# Patient Record
Sex: MALE | Race: OTHER | HISPANIC OR LATINO | URBAN - METROPOLITAN AREA
[De-identification: names, ages, dates, MRNs, and addresses within clinical notes are randomized per-mention and may not be internally consistent; named-entity substitution may affect disease eponyms.]

---

## 2019-06-19 ENCOUNTER — EMERGENCY (EMERGENCY)
Facility: HOSPITAL | Age: 74
LOS: 1 days | Discharge: LEFT BEFORE DISCHARGE | End: 2019-06-19
Attending: EMERGENCY MEDICINE | Admitting: EMERGENCY MEDICINE
Payer: SELF-PAY

## 2019-06-19 VITALS
HEART RATE: 106 BPM | RESPIRATION RATE: 18 BRPM | OXYGEN SATURATION: 98 % | HEIGHT: 72 IN | DIASTOLIC BLOOD PRESSURE: 82 MMHG | TEMPERATURE: 98 F | WEIGHT: 179.9 LBS | SYSTOLIC BLOOD PRESSURE: 144 MMHG

## 2019-06-19 DIAGNOSIS — R07.9 CHEST PAIN, UNSPECIFIED: ICD-10-CM

## 2019-06-19 PROCEDURE — 85025 COMPLETE CBC W/AUTO DIFF WBC: CPT

## 2019-06-19 PROCEDURE — 83880 ASSAY OF NATRIURETIC PEPTIDE: CPT

## 2019-06-19 PROCEDURE — 84484 ASSAY OF TROPONIN QUANT: CPT

## 2019-06-19 PROCEDURE — 99053 MED SERV 10PM-8AM 24 HR FAC: CPT

## 2019-06-19 PROCEDURE — 82553 CREATINE MB FRACTION: CPT

## 2019-06-19 PROCEDURE — 82550 ASSAY OF CK (CPK): CPT

## 2019-06-19 PROCEDURE — 36415 COLL VENOUS BLD VENIPUNCTURE: CPT

## 2019-06-19 PROCEDURE — 83735 ASSAY OF MAGNESIUM: CPT

## 2019-06-19 PROCEDURE — 85610 PROTHROMBIN TIME: CPT

## 2019-06-19 PROCEDURE — 80053 COMPREHEN METABOLIC PANEL: CPT

## 2019-06-19 PROCEDURE — 85730 THROMBOPLASTIN TIME PARTIAL: CPT

## 2019-06-19 PROCEDURE — 99284 EMERGENCY DEPT VISIT MOD MDM: CPT | Mod: 25

## 2019-06-19 PROCEDURE — 83690 ASSAY OF LIPASE: CPT

## 2019-06-19 PROCEDURE — 99283 EMERGENCY DEPT VISIT LOW MDM: CPT

## 2019-06-19 PROCEDURE — 93005 ELECTROCARDIOGRAM TRACING: CPT

## 2019-06-19 PROCEDURE — 93010 ELECTROCARDIOGRAM REPORT: CPT

## 2019-06-19 RX ORDER — SODIUM CHLORIDE 9 MG/ML
1000 INJECTION INTRAMUSCULAR; INTRAVENOUS; SUBCUTANEOUS ONCE
Refills: 0 | Status: DISCONTINUED | OUTPATIENT
Start: 2019-06-19 | End: 2019-06-23

## 2019-06-19 NOTE — ED PROVIDER NOTE - ATTENDING CONTRIBUTION TO CARE
74M c/o chest pain tonight, states intermittent.  no SOB.  no vomiting, no fevers.    gen- nad  heent- ncat, clear conj  cv -rrr  lungs -ctab  abd - soft, nt, nd  ext -wwp, no edema  neuro -aox3, steady gait, harkins  pt well-appearing, does not wish to stay in ED any longer for further treatment or testing.  pt with abnormal EKG however unwilling to stay in the ED.  pt left AMA. demonstrated clear understanding of risk of leaving AMA including but not limited to ACS, morbidity and death.

## 2019-06-19 NOTE — ED PROVIDER NOTE - CLINICAL SUMMARY MEDICAL DECISION MAKING FREE TEXT BOX
labs, cxr, ekg reviewed and results relayed with shared decision making process towards care moving forward labs taken ,, ekg reviewed  though patient walking about ED shouting for d/c and seeking AMA  d/c refusing multiple attempts to render care and or discuss circumstances and such given labs taken and results relayed  ,, ekg  as well reviewed  though patient walking about ED shouting for d/c and seeking AMA  d/c refusing multiple attempts to render care and or discuss circumstances and such ama d/c given

## 2024-01-24 ENCOUNTER — EMERGENCY (EMERGENCY)
Facility: HOSPITAL | Age: 79
LOS: 1 days | Discharge: HOME | End: 2024-01-24
Admitting: EMERGENCY MEDICINE
Payer: MEDICARE

## 2024-01-24 VITALS
HEART RATE: 81 BPM | OXYGEN SATURATION: 98 % | RESPIRATION RATE: 18 BRPM | SYSTOLIC BLOOD PRESSURE: 106 MMHG | TEMPERATURE: 98 F | DIASTOLIC BLOOD PRESSURE: 57 MMHG

## 2024-01-24 DIAGNOSIS — Z04.3 ENCOUNTER FOR EXAMINATION AND OBSERVATION FOLLOWING OTHER ACCIDENT: ICD-10-CM
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
## 2024-01-24 PROCEDURE — 70450 CT HEAD/BRAIN W/O DYE: CPT | Mod: 26

## 2024-01-24 PROCEDURE — 99284 EMERGENCY DEPT VISIT MOD MDM: CPT

## 2024-01-24 PROCEDURE — 72125 CT NECK SPINE W/O DYE: CPT | Mod: 26

## 2024-01-24 NOTE — ED PROVIDER NOTE - PATIENT PORTAL LINK FT
You can access the FollowMyHealth Patient Portal offered by [facility] by registering at the following website: [email or web address]. By joining [facility]’s FollowMyHealth portal, you will also be able to view your health information using other applications (apps) compatible with our system.

## 2024-01-24 NOTE — ED ADULT TRIAGE NOTE - CHIEF COMPLAINT QUOTE
Pt BIBEMS complaining of fall. PT states that he thinks he missed a step. Pt states that he fell  back hitting his head. Denies loc and use of blood thinners. Pt AAOx 3. Pt lives in shelter on [facility] Director Ms. Antonino [phone].

## 2024-01-24 NOTE — ED PROVIDER NOTE - PHYSICAL EXAMINATION
VITAL SIGNS: I have reviewed nursing notes and confirm.  CONSTITUTIONAL: Well-developed; well-nourished; in no acute distress.  SKIN: No acute rash.  HEAD: Normocephalic; atraumatic.  CARD: No extremity cyanosis. RRR, S1S2.  RESP: Speaks in full, clear sentences. CTA pb. No adventitious BS.  EXT: Moves all extremities normally.  NEURO: Alert, oriented. Grossly unremarkable. No focal deficits. Fluent speech.   PSYCH: Cooperative, appropriate. Mood and affect wnl.

## 2024-01-24 NOTE — ED PROVIDER NOTE - CLINICAL SUMMARY MEDICAL DECISION MAKING FREE TEXT BOX
78-year-old male, denies significant history, presenting to the emergency room complaining of a fall that occurred today. No scalp tenderness on exam and patient has no focal deficits as well.  He is otherwise well-appearing and in no acute distress.  Will plan to obtain CT head and cervical spine to rule out any acute findings.  Likely discharge in setting of negative workup.

## 2024-01-24 NOTE — ED ADULT NURSE NOTE - OBJECTIVE STATEMENT
Pt reports missing a step and falling back on his head. No LOC, no n/v, no vision changes. Pt denies c-spine tenderness.

## 2024-01-24 NOTE — ED ADULT NURSE REASSESSMENT NOTE - NS ED NURSE REASSESS COMMENT FT1
Patient endorsed to me at this time - c/c fall. Patient A+Ox3, reported he missed a step and fell backwards hitting his head. Denies LOC, not on anticoagulation, denies any pain at this time. No acute distress noted. Pending CT results.

## 2024-01-24 NOTE — ED ADULT NURSE NOTE - CHIEF COMPLAINT QUOTE
Pt BIBEMS complaining of fall. PT states that he thinks he missed a step. Pt states that he fell  back hitting his head. Denies loc and use of blood thinners. Pt AAOx 3. Pt lives in shelter on [facility] Director Ms. Antonino [phone].

## 2024-01-24 NOTE — ED ADULT NURSE NOTE - NSFALLRISKINTERV_ED_ALL_ED

## 2024-01-24 NOTE — ED PROVIDER NOTE - OBJECTIVE STATEMENT
78-year-old male, denies significant history, presenting to the emergency room complaining of a fall that occurred today.  Patient believes he missed a step and fell backwards.  He endorses head strike but denies LOC.  Patient further reports no headaches and does not use any blood thinners.  He also denies any neck or back pain.  Patient was able to ambulate since the incident.

## 2024-01-25 VITALS
TEMPERATURE: 98 F | HEART RATE: 60 BPM | SYSTOLIC BLOOD PRESSURE: 103 MMHG | DIASTOLIC BLOOD PRESSURE: 62 MMHG | RESPIRATION RATE: 18 BRPM | OXYGEN SATURATION: 98 %

## 2024-01-25 RX ORDER — HYDROXYZINE HCL 10 MG
25 TABLET ORAL ONCE
Refills: 0 | Status: COMPLETED | OUTPATIENT
Start: 2024-01-25 | End: 2024-01-25

## 2024-01-25 RX ADMIN — Medication 25 MILLIGRAM(S): at 05:15

## 2024-05-30 ENCOUNTER — EMERGENCY (EMERGENCY)
Facility: HOSPITAL | Age: 79
LOS: 1 days | Discharge: HOME | End: 2024-05-30
Admitting: STUDENT IN AN ORGANIZED HEALTH CARE EDUCATION/TRAINING PROGRAM
Payer: MEDICARE

## 2024-05-30 VITALS
SYSTOLIC BLOOD PRESSURE: 129 MMHG | DIASTOLIC BLOOD PRESSURE: 82 MMHG | RESPIRATION RATE: 16 BRPM | HEART RATE: 83 BPM | OXYGEN SATURATION: 99 % | TEMPERATURE: 99 F

## 2024-05-30 PROCEDURE — 99284 EMERGENCY DEPT VISIT MOD MDM: CPT

## 2024-05-30 PROCEDURE — 99053 MED SERV 10PM-8AM 24 HR FAC: CPT

## 2024-05-30 PROCEDURE — 99283 EMERGENCY DEPT VISIT LOW MDM: CPT

## 2024-05-30 RX ORDER — DIPHENHYDRAMINE HCL 50 MG
50 CAPSULE ORAL ONCE
Refills: 0 | Status: COMPLETED | OUTPATIENT
Start: 2024-05-30 | End: 2024-05-30

## 2024-05-30 RX ORDER — DIPHENHYDRAMINE HCL 50 MG
1 CAPSULE ORAL
Qty: 15 | Refills: 0
Start: 2024-05-30 | End: 2024-06-03

## 2024-05-30 RX ADMIN — Medication 60 MILLIGRAM(S): at 23:41

## 2024-05-30 RX ADMIN — Medication 50 MILLIGRAM(S): at 23:42

## 2024-05-30 NOTE — ED PROVIDER NOTE - OBJECTIVE STATEMENT
80 yo male w/o any known pmh, not on any medications, in the Er c/o generalized itching. Pt reports its started today and getting worse. pt also mentioned he lives in the shelter for the past year. Had similar itching in the past. Unknown causes. Pt states he was seen at the different ER and given " pills for a few days" and itching stopped.  pt denies any fever, chills, difficulty swallowing, difficulty breathing, denies any pain

## 2024-05-30 NOTE — ED PROVIDER NOTE - NSFOLLOWUPINSTRUCTIONS_ED_ALL_ED_FT
Please take medications as prescribed and follow up in a few days at the clinic with PMD or dermatologist for re-evaluation.         Rash, Adult  Heat rash on a person's hand.   A rash is a breakout of spots or blotches on the skin. It can affect the way the skin looks and feels. Many things can cause a rash. Common causes include:  Viral infections. These include colds, measles, and hand, foot, and mouth disease.  Bacterial infections. These include scarlet fever and impetigo.  Fungal infections. These include athlete's foot, ringworm, and yeast rashes.  Skin irritation. This may be from heat rash, exposure to moisture or friction for a long time (intertrigo), or exposure to soap or skin care products (eczema).  Allergic reactions. These may be caused by foods, medicines, or things like poison ivy.  Some rashes may go away after a few days. Others may last for a few weeks. The goal of treatment is to stop the itching and keep the rash from spreading.    Follow these instructions at home:  Medicine    A tube of ointment.  Take or apply over-the-counter and prescription medicines only as told by your health care provider. These may include:  Corticosteroids. These can help treat red or swollen skin. They may be given as creams or as medicines to take by mouth (orally).  Anti-itch lotions.  Allergy medicines.  Pain medicine.  Antifungal medicine if the rash is from a fungal infection.  Antibiotics if you have an infection.  Skin care    Apply cool, wet cloths (compresses) to the affected areas.  Do not scratch or rub your skin.  Avoid covering the rash. Keep it exposed to air as often as you can.  Managing itching and discomfort    Avoid hot showers and baths. These can make itching worse. A cold shower may help.  Try taking a bath with:  Epsom salts. You can get these at your local pharmacy or grocery store. Follow the instructions on the package.  Baking soda. Pour a small amount into the bath as told by your provider.  Colloidal oatmeal. You can get this at your local pharmacy or grocery store. Follow the instructions on the package.  Try putting baking soda paste on your skin. Stir water into baking soda until it becomes like a paste.  Try using calamine lotion or cortisone cream to help with itchiness.  Keep cool. Stay out of the sun. Sweating and being hot can make itching worse.  General instructions    A person writing in a journal.  Rest as needed.  Drink enough fluid to keep your pee (urine) pale yellow.  Wear loose-fitting clothes.  Avoid scented soaps, detergents, and perfumes. Use gentle soaps, detergents, perfumes, and cosmetics.  Avoid the things that cause your rash (triggers). Keep a journal to help keep track of your triggers. Write down:  What you eat.  What cosmetics you use.  What you drink.  What you wear. This includes jewelry.  Contact a health care provider if:  You sweat at night more than normal.  You pee (urinate) more or less than normal, or your pee is a darker color than normal.  Your eyes become sensitive to light.  Your skin or the white parts of your eyes turn yellow (jaundice).  Your skin tingles or is numb.  You get painful blisters in your nose or mouth.  Your rash does not go away after a few days, or it gets worse.  You are more tired or thirsty than normal.  You have new or worse symptoms. These may include:  Pain in your abdomen.  Fever.  Diarrhea or vomiting.  Weakness or weight loss.  Get help right away if:  You get confused.  You have a severe headache, a stiff neck, or severe joint pain or stiffness.  You become very sleepy or not responsive.  You have a seizure.  This information is not intended to replace advice given to you by your health care provider. Make sure you discuss any questions you have with your health care provider.

## 2024-05-30 NOTE — ED ADULT NURSE NOTE - ISAR SCREEN YN
Normal rate, regular rhythm.  Heart sounds S1, S2.  No murmurs, rubs or gallops. no chest wall ttp
No

## 2024-05-30 NOTE — ED PROVIDER NOTE - CLINICAL SUMMARY MEDICAL DECISION MAKING FREE TEXT BOX
78 yo male w/o any known pmh, not on any medications, in the Er c/o generalized itching. Pt reports its started today and getting worse. pt also mentioned he lives in the shelter for the past year. Had similar itching in the past. Unknown causes. Pt states he was seen at the different ER and given " pills for a few days" and itching stopped.  pt denies any fever, chills, difficulty swallowing, difficulty breathing, denies any pain.   Pt is afebrile and non -toxic appearing, no clinical findings to suspect rash be an infectious in origin, most likely an allergic response. plan for antihistamines and steroids oral. d/c home with Rx for then same. no airway compromise, no oropharyngeal involvement. d/c home stable. f/u with dermatologist recommended.

## 2024-05-30 NOTE — ED PROVIDER NOTE - PATIENT PORTAL LINK FT
You can access the FollowMyHealth Patient Portal offered by [facility] by registering at the following website: [email or web address]. By joining [facility]’s FollowMyHealth portal, you will also be able to view your health information using other applications (apps) compatible with our system.

## 2024-05-30 NOTE — ED ADULT NURSE NOTE - OBJECTIVE STATEMENT
pt received into spot E awake alert appears comfortable biba from the shelter for eval of body itching. no begs visible, respirations unlabored airway patent speaks clear full sentences no tongue or throat swelling hives or rash. pt requesting bed to lie down and refusing to stay in assigned treatment room wandering throughout department for empty bed. educated on importance of staying in assigned spot to expedite evaluation as provider will be able to find him there not wandering about pt received into spot E awake alert appears comfortable biba from the shelter for eval of body itching. no begs visible, respirations unlabored airway patent speaks clear full sentences no tongue or throat swelling but noted hives generalized on torso. pt requesting bed to lie down and refusing to stay in assigned treatment room wandering throughout department for empty bed. educated on importance of staying in assigned spot to expedite evaluation as provider will be able to find him there not wandering about

## 2024-05-30 NOTE — ED PROVIDER NOTE - NSPTACCESSSVCSAPPT_ED_ALL_ED
Specialty Care (immediate).../Specialty Care and/or PCP (routine).../Connect patient to Primary Care...

## 2024-05-30 NOTE — ED PROVIDER NOTE - SKIN, MLM
Skin normal color for race, warm, dry and intact.  generalized diffuse slightly raised, pruritic rash, no excoriations, no lesions, no ulcers,

## 2024-05-30 NOTE — ED PROVIDER NOTE - NSFOLLOWUPCLINICS_GEN_ALL_ED_FT
[facility]  Family Medicine  [address]  [place], [place] [address]  Phone: [phone]  Fax:

## 2024-05-30 NOTE — ED ADULT NURSE NOTE - NSFALLUNIVINTERV_ED_ALL_ED
Bed/Stretcher in lowest position, wheels locked, appropriate side rails in place/Call bell, personal items and telephone in reach/Instruct patient to call for assistance before getting out of bed/chair/stretcher/Non-slip footwear applied when patient is off stretcher/[place] to call system/Physically safe environment - no spills, clutter or unnecessary equipment/Purposeful proactive rounding/Room/bathroom lighting operational, light cord in reach

## 2024-05-30 NOTE — ED ADULT NURSE NOTE - AS PAIN REST
2 (mild pain) Home Suture Removal Text: Patient was provided instructions on removing sutures and will remove their sutures at home.  If they have any questions or difficulties they will call the office.

## 2024-05-31 VITALS
RESPIRATION RATE: 18 BRPM | SYSTOLIC BLOOD PRESSURE: 145 MMHG | TEMPERATURE: 98 F | HEART RATE: 73 BPM | OXYGEN SATURATION: 99 % | DIASTOLIC BLOOD PRESSURE: 78 MMHG

## 2024-05-31 DIAGNOSIS — L29.9 PRURITUS, UNSPECIFIED: ICD-10-CM

## 2024-05-31 DIAGNOSIS — L50.9 URTICARIA, UNSPECIFIED: ICD-10-CM

## 2024-12-31 ENCOUNTER — EMERGENCY (EMERGENCY)
Facility: HOSPITAL | Age: 79
LOS: 1 days | Discharge: HOME | End: 2024-12-31
Attending: EMERGENCY MEDICINE | Admitting: EMERGENCY MEDICINE
Payer: MEDICARE

## 2024-12-31 VITALS
SYSTOLIC BLOOD PRESSURE: 155 MMHG | DIASTOLIC BLOOD PRESSURE: 84 MMHG | HEART RATE: 100 BPM | RESPIRATION RATE: 18 BRPM | OXYGEN SATURATION: 96 % | TEMPERATURE: 98 F

## 2024-12-31 DIAGNOSIS — L29.9 PRURITUS, UNSPECIFIED: ICD-10-CM

## 2024-12-31 DIAGNOSIS — B85.2 PEDICULOSIS, UNSPECIFIED: ICD-10-CM

## 2024-12-31 PROCEDURE — 99284 EMERGENCY DEPT VISIT MOD MDM: CPT

## 2024-12-31 RX ORDER — DIPHENHYDRAMINE HCL 25 MG
50 CAPSULE ORAL ONCE
Refills: 0 | Status: COMPLETED | OUTPATIENT
Start: 2024-12-31 | End: 2024-12-31

## 2024-12-31 RX ADMIN — Medication 1 APPLICATION(S): at 11:49

## 2024-12-31 RX ADMIN — Medication 50 MILLIGRAM(S): at 11:48

## 2024-12-31 NOTE — ED PROVIDER NOTE - PHYSICAL EXAMINATION
GENERAL: well appearing in no acute distress, non-toxic appearing  HEAD: normocephalic, atraumatic  CARDIAC: regular rate and rhythm, normal S1S2, no appreciable murmurs, 2+ pulses in UE/LE b/l  PULM: normal breath sounds, clear to ascultation bilaterally, no rales, rhonchi, wheezing  GI: abdomen nondistended, soft, nontender, no guarding, rebound tenderness  MSK: b/l peripheral edema  SKIN raised erythematous papules w no vesicular lesions to chest arms and abdomen sparing palms/mucosal surfaces

## 2024-12-31 NOTE — ED PROVIDER NOTE - CLINICAL SUMMARY MEDICAL DECISION MAKING FREE TEXT BOX
no known pmhx presents from shelter for full body pruritis. states he has had it the past few weeks. denies any bug bites. denies any fevers, n/v, chest pain, sob, abdominal pain. on arrival bp 155/84  orally 98 degrees 96% on RA. pt appears uncomfortable due to itching, and has excoriations w raised papules on chest abdomen and arms. has been seen in the ED this past year twice for the same concern, states he got a pill which helped for a few days atnd it stopped. no vesicular lesions, nothing on palms or in oral mucosa. will give benadryl and permethrin concern for bed bugs and dc w primary care follow up. ekg showing 1st degree av block, will refer to cardiology, pt has no active chest pain at this time.

## 2024-12-31 NOTE — ED PROVIDER NOTE - CARE PLAN
1 Principal Discharge DX:	Skin pruritus   Principal Discharge DX:	Skin pruritus  Secondary Diagnosis:	Lice infestation

## 2024-12-31 NOTE — ED PROVIDER NOTE - PATIENT PORTAL LINK FT
You can access the FollowMyHealth Patient Portal offered by [facility] by registering at the following website: [email or web address]. By joining [employer]’s FollowMyHealth portal, you will also be able to view your health information using other applications (apps) compatible with our system.

## 2024-12-31 NOTE — ED PROVIDER NOTE - NSFOLLOWUPINSTRUCTIONS_ED_ALL_ED_FT
You were seen in the ED for a full body rash. Use the cream permethrin as instructed. Follow up with your Primary Doctor, and I have put in a referral for cardiologist.    Return with any fevers, chest pain, or any other concerning symptoms.

## 2024-12-31 NOTE — ED PROVIDER NOTE - NS ED ATTENDING STATEMENT MOD
I have seen and examined this patient and fully participated in the care of this patient as the teaching attending.  The service was shared with the VINI.  I reviewed and verified the documentation.

## 2024-12-31 NOTE — ED ADULT TRIAGE NOTE - CHIEF COMPLAINT QUOTE
"arabella been feeling weak for months and people keep stealing from me in the shelter"  befast negative

## 2024-12-31 NOTE — ED ADULT TRIAGE NOTE - SPO2 (%)
Remdesivir Initiation Note    This patient meets criteria for initiation of remdesivir based on the following:  · Proven COVID-19  · Moderate disease (Requiring supplemental O2)  · Acceptable hepatic function (ALT within 5 times ULN)    Exclusion Criteria:  ? Severe disease requiring invasive or non-invasive mechanical ventilation (includes HFNC & BiPAP)  ? Could consider use in patients requiring high flow if early on in the disease course (based on symptom duration)  ? Use of more than 1 vasopressor prior to remdesivir initiation  ? Already improving on supportive treatment and/or impending discharge  ? Patients in whom the clinical team think death is in the immediate short-term where remdesivir is unlikely to change the clinical outcome     Liver function tests will be monitored daily while on remdesivir. 96

## 2024-12-31 NOTE — ED PROVIDER NOTE - OBJECTIVE STATEMENT
79 no known pmhx presents from [facility] for full body pruritis. states he has had it the past few weeks. deneis any bug bites. denies ayn fevers, n/v, chest pain, sob, abdominal pain. on arrival bp 155/84  orally 98 degrees 96% on RA.

## 2025-04-27 ENCOUNTER — EMERGENCY (EMERGENCY)
Facility: HOSPITAL | Age: 80
LOS: 1 days | End: 2025-04-27
Attending: STUDENT IN AN ORGANIZED HEALTH CARE EDUCATION/TRAINING PROGRAM | Admitting: EMERGENCY MEDICINE
Payer: MEDICARE

## 2025-04-27 VITALS
SYSTOLIC BLOOD PRESSURE: 133 MMHG | HEART RATE: 93 BPM | WEIGHT: 134.92 LBS | RESPIRATION RATE: 18 BRPM | TEMPERATURE: 98 F | DIASTOLIC BLOOD PRESSURE: 61 MMHG | OXYGEN SATURATION: 97 %

## 2025-04-27 PROCEDURE — 99284 EMERGENCY DEPT VISIT MOD MDM: CPT

## 2025-04-27 RX ORDER — IBUPROFEN 200 MG
600 TABLET ORAL ONCE
Refills: 0 | Status: COMPLETED | OUTPATIENT
Start: 2025-04-27 | End: 2025-04-27

## 2025-04-27 RX ORDER — BACITRACIN 500 UNIT/G
1 OINTMENT (GRAM) TOPICAL ONCE
Refills: 0 | Status: COMPLETED | OUTPATIENT
Start: 2025-04-27 | End: 2025-04-27

## 2025-04-27 RX ORDER — ACETAMINOPHEN 500 MG/5ML
650 LIQUID (ML) ORAL ONCE
Refills: 0 | Status: COMPLETED | OUTPATIENT
Start: 2025-04-27 | End: 2025-04-27

## 2025-04-27 RX ADMIN — Medication 600 MILLIGRAM(S): at 01:42

## 2025-04-27 RX ADMIN — Medication 1 APPLICATION(S): at 01:42

## 2025-04-27 RX ADMIN — Medication 650 MILLIGRAM(S): at 01:42

## 2025-04-27 NOTE — ED PROVIDER NOTE - NSFOLLOWUPCLINICS_GEN_ALL_ED_FT
[facility]  Podiatry  [address] [place] [address]  Phone: [phone]  Fax:

## 2025-04-27 NOTE — ED PROVIDER NOTE - NSFOLLOWUPINSTRUCTIONS_ED_ALL_ED_FT
[FreeTextEntry1] : Podiatric evaluation and treatment full thickness debridement of ulceration right hallux\par no need for additional oral antibiosis\par  x-rays 3 views right foot no signs of fracture or osteomyelitis noted on x-ray report\par stress importance of staying off of right foot\par recommend good blood sugar control\par refer to vascular service for evaluation of venous insufficiency bilaterally\par \par Patient using Mupirocin on toe\par recommend patient always wear low profile cam walker for right foot ulcer at all times when ambulating\par full thickness debridement of ulcer with sterile #10 blade to the level of the subcutaneous tissue\par No signs of infection both feet\par Rx Rocker bottom to right shoe- refer to mateo villanueva patient not wearing rocker bottom boot\par Discuss IPJ fusion right hallux if wound doesn't heal patient wants to hold off on surgery and will try to wear cam walker more often\par patient is not staying off of right foot so surgical management fusion is not recommended at this time\par patient is at very high risk for amputation of right hallux since he has not been staying off of right foot and continues to ambulate without off loading shoe\par return 2 week\par \par V/S: 150/70, HR: 77, Temp:98.1, Resp:18 Chronic Pain    Chronic pain is a complex condition and the Emergency Department is not the ideal place to manage this condition. Prescription painkillers must be written by your primary care provider or a pain management physician. Avoid activities or triggers that exacerbate your pain.    SEEK IMMEDIATE MEDICAL CARE IF YOU HAVE ANY OF THE FOLLOWING SYMPTOMS: severe chest pain, fainting, vomiting blood, dark or bloody stools, or pain different from your chronic pain.

## 2025-04-27 NOTE — ED PROVIDER NOTE - PATIENT PORTAL LINK FT
You can access the FollowMyHealth Patient Portal offered by [facility] by registering at the following website: [email or web address]. By joining [employer]’s FollowMyHealth portal, you will also be able to view your health information using other applications (apps) compatible with our system.

## 2025-04-27 NOTE — ED PROVIDER NOTE - CLINICAL SUMMARY MEDICAL DECISION MAKING FREE TEXT BOX
79 undomiciled w no sig pmh p/w b/l feet pain. states due to bad socks and wet weather, Patient denies headache, vision changes, eye pain, LOC, focal weakness/numbness, neck pain, fever, cough sore throat, chills, chest pain, palpitations, shortness of breath, wheezing, stridor, neck/back pain, other MSK pain, abd pain, nausea, vomiting, diarrhea, constipation, blood in stool, urinary cmplaints, rash, trauma. states he has not seen podiatrist. no signs of infection, no trauma, low suspciion for fx vs dislocation. will treat chronic pain  w tylenol ibuprofen and give new socks, pt requesting food

## 2025-04-27 NOTE — ED PROVIDER NOTE - PHYSICAL EXAMINATION
General: No acute distress, mentation at baseline,  well nourished, well developed  HEENT: NCAT, Neck supple without meningismus, PERRL, no conjunctival injection  Lungs: CTAB, No wheeze or crackles, No retractions, No increased work of breathing  Heart: S1S2 RRR, No M/R/G, Pules equal Bilaterally in upper and lower extremities distally  Abd: soft, NT/ND, No guarding, No rebound.  No hernias, no palpable masses.  bilateral feet: Proximal Tibia nontender, Medial malleolus nontender, Lateral malleolus nontender, Calcaneus nontender , Tarsometatarsal region nontender, Rest of foot and ankle without marked tenderness, Varus and Valgus Stress of ankle joint without significant laxity, Full Range of Motion with full strength, Skin on plantar section of midfoot without ecchymosism Capillary refill <2seconds, Distal Sensation to light touch intact, Compartments surrounding are soft . skin abrasions over b/l feet  Skin: No rash noted, warm dry.  Neuro:  Grossly normal.  No difficulty ambulating. No focal deficits.  Psychiatric: Appropriate mood and affect.

## 2025-04-29 DIAGNOSIS — M79.671 PAIN IN RIGHT FOOT: ICD-10-CM

## 2025-04-29 DIAGNOSIS — M79.604 PAIN IN RIGHT LEG: ICD-10-CM

## 2025-04-29 DIAGNOSIS — M79.605 PAIN IN LEFT LEG: ICD-10-CM

## 2025-04-29 DIAGNOSIS — M79.672 PAIN IN LEFT FOOT: ICD-10-CM

## 2025-05-05 ENCOUNTER — EMERGENCY (EMERGENCY)
Facility: HOSPITAL | Age: 80
LOS: 1 days | End: 2025-05-05
Attending: EMERGENCY MEDICINE | Admitting: EMERGENCY MEDICINE
Payer: MEDICARE

## 2025-05-05 VITALS
WEIGHT: 139.99 LBS | OXYGEN SATURATION: 98 % | SYSTOLIC BLOOD PRESSURE: 112 MMHG | HEIGHT: 72 IN | RESPIRATION RATE: 18 BRPM | TEMPERATURE: 99 F | HEART RATE: 74 BPM | DIASTOLIC BLOOD PRESSURE: 66 MMHG

## 2025-05-05 PROBLEM — Z78.9 OTHER SPECIFIED HEALTH STATUS: Chronic | Status: ACTIVE | Noted: 2025-04-27

## 2025-05-05 PROCEDURE — 99053 MED SERV 10PM-8AM 24 HR FAC: CPT

## 2025-05-05 PROCEDURE — 99283 EMERGENCY DEPT VISIT LOW MDM: CPT

## 2025-05-05 RX ORDER — ACETAMINOPHEN 500 MG/5ML
975 LIQUID (ML) ORAL ONCE
Refills: 0 | Status: COMPLETED | OUTPATIENT
Start: 2025-05-05 | End: 2025-05-05

## 2025-05-05 RX ORDER — NAPROXEN SODIUM 275 MG
500 TABLET ORAL ONCE
Refills: 0 | Status: COMPLETED | OUTPATIENT
Start: 2025-05-05 | End: 2025-05-05

## 2025-05-05 RX ADMIN — Medication 975 MILLIGRAM(S): at 06:56

## 2025-05-05 RX ADMIN — Medication 500 MILLIGRAM(S): at 06:57

## 2025-05-05 NOTE — ED PROVIDER NOTE - PATIENT PORTAL LINK FT
You can access the FollowMyHealth Patient Portal offered by [facility] by registering at the following website: [email or web address]. By joining [employer]’s FollowMyHealth portal, you will also be able to view your health information using other applications (apps) compatible with our system.

## 2025-05-05 NOTE — ED ADULT NURSE NOTE - CHIEF COMPLAINT QUOTE
BIBA [facility]. Pt c/o B/L leg pain that started appx 2 hours ago. Pt denies fall or injuries. Pt denies further at triage. NKDA/-PMH.

## 2025-05-05 NOTE — ED PROVIDER NOTE - OBJECTIVE STATEMENT
80 yo M with no known PMHx, undomiciled, BIBA for BLE pain x few hours. [place] the train and noted pain to BLE with stiffness sensation. Denies fever, chills, trauma, fall, CP, SOB, palpitations, N/V, HA, dizziness, focal weakness, rash, calf pain, swelling, redness, change in urinary/bowel function, LOC, and malaise.

## 2025-05-05 NOTE — ED PROVIDER NOTE - PHYSICAL EXAMINATION
Exam chaperoned by PCT Diogo Wang - Unkempt M, NAD, comfortable and non-toxic appearing, speaking in full sentences  Skin - warm, dry, intact, no acute rash   HEENT - AT/NC, no nasal discharge, poor dentition, airway patent, neck supple and FROM  CV - S1S2, R/R/R  Resp - CTAB, no r/r/w  GI - soft, NT, +palpable b/l inguinal hernia, reducible, no CVAT b/l   MS - No acute or gross deformities noted to extremities. w/w/p, no c/c/e, no focal tenderness, FROM, NV intact, +SILT, compartment soft, brisk cap refill, No midline spinal tenderness or step off on palpation  Neuro - AxOx3, no focal neuro deficits, ambulatory without gait disturbance

## 2025-05-05 NOTE — ED PROVIDER NOTE - ATTENDING CONTRIBUTION TO CARE
80 yo M with no known PMHx, undomiciled, BIBA for BLE pain x few hours. Patient received appropriate medical screening exam. agree with management and documentation.

## 2025-05-05 NOTE — ED PROVIDER NOTE - CARE PROVIDER_API CALL
Rhona Pattersonbeth  Internal Medicine  [address] [place] [id number]  Phone: [phone]  Fax: [phone]  Follow Up Time:

## 2025-05-05 NOTE — ED PROVIDER NOTE - NSICDXNOPASTSURGICALHX_GEN_ALL_ED
<-- Click to add NO significant Past Surgical History Taltz Counseling: I discussed with the patient the risks of ixekizumab including but not limited to immunosuppression, serious infections, worsening of inflammatory bowel disease and drug reactions.  The patient understands that monitoring is required including a PPD at baseline and must alert us or the primary physician if symptoms of infection or other concerning signs are noted.

## 2025-05-05 NOTE — ED PROVIDER NOTE - CLINICAL SUMMARY MEDICAL DECISION MAKING FREE TEXT BOX
78 yo M with no known PMHx, undomiciled, BIBA for BLE pain x few hours. [place] the train and noted pain to BLE with stiffness sensation.   - NV intact, compartment soft, palpable symmetric distal pulses, no focal neuro deficits, no acute wound or rash on exam. Not suspecting acute neuro/arterial/venous/infectious process   - offered oral analgesics with adequate controlled of pain   - medically stable for dc and outpt resources provided

## 2025-05-05 NOTE — ED ADULT TRIAGE NOTE - CHIEF COMPLAINT QUOTE
BIBA [facility]. Pt c/o B/L leg pain that started appx 2 hours ago. Pt denies fall or injuries. Pt denies further at triage. NKDA/-PMH.

## 2025-05-05 NOTE — ED ADULT NURSE NOTE - PRIMARY CARE PROVIDER
unk Xolair Counseling:  Patient informed of potential adverse effects including but not limited to fever, muscle aches, rash and allergic reactions.  The patient verbalized understanding of the proper use and possible adverse effects of Xolair.  All of the patient's questions and concerns were addressed.

## 2025-05-05 NOTE — ED ADULT TRIAGE NOTE - NSWEIGHTCALCTOOLDRUG_GEN_A_CORE
1 attempt was made to reach patient, which have been unsuccessful. Unable to leave voicemail on 08/29.  used

## 2025-05-05 NOTE — ED PROVIDER NOTE - NSFOLLOWUPINSTRUCTIONS_ED_ALL_ED_FT
Chronic Pain    Chronic pain is a complex condition and the Emergency Department is not the ideal place to manage this condition. Prescription painkillers must be written by your primary care provider or a pain management physician. Avoid activities or triggers that exacerbate your pain.    SEEK IMMEDIATE MEDICAL CARE IF YOU HAVE ANY OF THE FOLLOWING SYMPTOMS: severe chest pain, fainting, vomiting blood, dark or bloody stools, or pain different from your chronic pain.   Follow up with your primary care doctor or clinics listed below if you do not have a doctor,    [address] [address]  To make an appointment, call [phone]    [facility]  Address: [address] [place] [id number]  Appointment Center: [phone] ([phone])     [facility] [employer] [facility] [facility] is a good referral line for crisis and substance abuse help.   has drop in programs all over the [facility].    24-Hour Drop-In Centers For Adults:  [facility] (18+) - [address] (Subway: [address])    [facility] (25+) - [address] [facility] [address] (Subway: [address] to [facility])    [facility] (18+) - [address] [facility] (Subway: A to [facility])    Drop-In Centers for Adults:  [facility] (18+) - [address] (Near [facility] - Subway: 1/2/3/A/C/E to [address])     Return to the ER for Emergencies.  Return immediately for any new or worsening symptoms or any new concerns

## 2025-05-08 DIAGNOSIS — M79.662 PAIN IN LEFT LOWER LEG: ICD-10-CM

## 2025-05-08 DIAGNOSIS — F17.200 NICOTINE DEPENDENCE, UNSPECIFIED, UNCOMPLICATED: ICD-10-CM

## 2025-05-08 DIAGNOSIS — M79.661 PAIN IN RIGHT LOWER LEG: ICD-10-CM

## 2025-05-08 DIAGNOSIS — M25.50 PAIN IN UNSPECIFIED JOINT: ICD-10-CM

## 2025-05-08 DIAGNOSIS — Z59.00 HOMELESSNESS UNSPECIFIED: ICD-10-CM

## 2025-05-08 SDOH — ECONOMIC STABILITY - HOUSING INSECURITY: HOMELESSNESS UNSPECIFIED: Z59.00

## 2025-05-24 ENCOUNTER — EMERGENCY (EMERGENCY)
Facility: HOSPITAL | Age: 80
LOS: 1 days | End: 2025-05-24
Attending: EMERGENCY MEDICINE | Admitting: EMERGENCY MEDICINE
Payer: MEDICARE

## 2025-05-24 VITALS
HEART RATE: 90 BPM | SYSTOLIC BLOOD PRESSURE: 108 MMHG | OXYGEN SATURATION: 99 % | RESPIRATION RATE: 16 BRPM | TEMPERATURE: 98 F | DIASTOLIC BLOOD PRESSURE: 71 MMHG

## 2025-05-24 VITALS
WEIGHT: 190.04 LBS | HEIGHT: 72 IN | SYSTOLIC BLOOD PRESSURE: 105 MMHG | TEMPERATURE: 98 F | OXYGEN SATURATION: 98 % | DIASTOLIC BLOOD PRESSURE: 76 MMHG | HEART RATE: 112 BPM | RESPIRATION RATE: 19 BRPM

## 2025-05-24 LAB
ALBUMIN SERPL ELPH-MCNC: 3.9 G/DL — SIGNIFICANT CHANGE UP (ref 3.3–5)
ALP SERPL-CCNC: 86 U/L — SIGNIFICANT CHANGE UP (ref 40–120)
ALT FLD-CCNC: 11 U/L — SIGNIFICANT CHANGE UP (ref 10–45)
ANION GAP SERPL CALC-SCNC: 12 MMOL/L — SIGNIFICANT CHANGE UP (ref 5–17)
AST SERPL-CCNC: 21 U/L — SIGNIFICANT CHANGE UP (ref 10–40)
BASOPHILS # BLD AUTO: 0.05 K/UL — SIGNIFICANT CHANGE UP (ref 0–0.2)
BASOPHILS NFR BLD AUTO: 0.4 % — SIGNIFICANT CHANGE UP (ref 0–2)
BILIRUB SERPL-MCNC: 0.3 MG/DL — SIGNIFICANT CHANGE UP (ref 0.2–1.2)
BUN SERPL-MCNC: 40 MG/DL — HIGH (ref 7–23)
CALCIUM SERPL-MCNC: 9.4 MG/DL — SIGNIFICANT CHANGE UP (ref 8.4–10.5)
CHLORIDE SERPL-SCNC: 103 MMOL/L — SIGNIFICANT CHANGE UP (ref 96–108)
CO2 SERPL-SCNC: 25 MMOL/L — SIGNIFICANT CHANGE UP (ref 22–31)
CREAT SERPL-MCNC: 1.85 MG/DL — HIGH (ref 0.5–1.3)
EGFR: 36 ML/MIN/1.73M2 — LOW
EGFR: 36 ML/MIN/1.73M2 — LOW
EOSINOPHIL # BLD AUTO: 0.77 K/UL — HIGH (ref 0–0.5)
EOSINOPHIL NFR BLD AUTO: 6 % — SIGNIFICANT CHANGE UP (ref 0–6)
GLUCOSE SERPL-MCNC: 114 MG/DL — HIGH (ref 70–99)
HCT VFR BLD CALC: 35.6 % — LOW (ref 39–50)
HGB BLD-MCNC: 11.7 G/DL — LOW (ref 13–17)
IMM GRANULOCYTES NFR BLD AUTO: 0.5 % — SIGNIFICANT CHANGE UP (ref 0–0.9)
LIDOCAIN IGE QN: 30 U/L — SIGNIFICANT CHANGE UP (ref 7–60)
LYMPHOCYTES # BLD AUTO: 1.12 K/UL — SIGNIFICANT CHANGE UP (ref 1–3.3)
LYMPHOCYTES # BLD AUTO: 8.7 % — LOW (ref 13–44)
MAGNESIUM SERPL-MCNC: 2.3 MG/DL — SIGNIFICANT CHANGE UP (ref 1.6–2.6)
MCHC RBC-ENTMCNC: 29.9 PG — SIGNIFICANT CHANGE UP (ref 27–34)
MCHC RBC-ENTMCNC: 32.9 G/DL — SIGNIFICANT CHANGE UP (ref 32–36)
MCV RBC AUTO: 91 FL — SIGNIFICANT CHANGE UP (ref 80–100)
MONOCYTES # BLD AUTO: 1.06 K/UL — HIGH (ref 0–0.9)
MONOCYTES NFR BLD AUTO: 8.2 % — SIGNIFICANT CHANGE UP (ref 2–14)
NEUTROPHILS # BLD AUTO: 9.87 K/UL — HIGH (ref 1.8–7.4)
NEUTROPHILS NFR BLD AUTO: 76.2 % — SIGNIFICANT CHANGE UP (ref 43–77)
NRBC BLD AUTO-RTO: 0 /100 WBCS — SIGNIFICANT CHANGE UP (ref 0–0)
PLATELET # BLD AUTO: 269 K/UL — SIGNIFICANT CHANGE UP (ref 150–400)
POTASSIUM SERPL-MCNC: 4.7 MMOL/L — SIGNIFICANT CHANGE UP (ref 3.5–5.3)
POTASSIUM SERPL-SCNC: 4.7 MMOL/L — SIGNIFICANT CHANGE UP (ref 3.5–5.3)
PROT SERPL-MCNC: 7.9 G/DL — SIGNIFICANT CHANGE UP (ref 6–8.3)
RBC # BLD: 3.91 M/UL — LOW (ref 4.2–5.8)
RBC # FLD: 15.2 % — HIGH (ref 10.3–14.5)
SODIUM SERPL-SCNC: 140 MMOL/L — SIGNIFICANT CHANGE UP (ref 135–145)
WBC # BLD: 12.93 K/UL — HIGH (ref 3.8–10.5)
WBC # FLD AUTO: 12.93 K/UL — HIGH (ref 3.8–10.5)

## 2025-05-24 PROCEDURE — 80053 COMPREHEN METABOLIC PANEL: CPT

## 2025-05-24 PROCEDURE — 83735 ASSAY OF MAGNESIUM: CPT

## 2025-05-24 PROCEDURE — 99284 EMERGENCY DEPT VISIT MOD MDM: CPT

## 2025-05-24 PROCEDURE — 36415 COLL VENOUS BLD VENIPUNCTURE: CPT

## 2025-05-24 PROCEDURE — 99053 MED SERV 10PM-8AM 24 HR FAC: CPT

## 2025-05-24 PROCEDURE — 85025 COMPLETE CBC W/AUTO DIFF WBC: CPT

## 2025-05-24 PROCEDURE — 83690 ASSAY OF LIPASE: CPT

## 2025-05-24 PROCEDURE — 99284 EMERGENCY DEPT VISIT MOD MDM: CPT | Mod: 25

## 2025-05-24 PROCEDURE — 96374 THER/PROPH/DIAG INJ IV PUSH: CPT

## 2025-05-24 RX ORDER — KETOROLAC TROMETHAMINE 30 MG/ML
15 INJECTION, SOLUTION INTRAMUSCULAR; INTRAVENOUS ONCE
Refills: 0 | Status: DISCONTINUED | OUTPATIENT
Start: 2025-05-24 | End: 2025-05-24

## 2025-05-24 RX ORDER — MUPIROCIN CALCIUM 20 MG/G
1 CREAM TOPICAL ONCE
Refills: 0 | Status: COMPLETED | OUTPATIENT
Start: 2025-05-24 | End: 2025-05-24

## 2025-05-24 RX ORDER — MUPIROCIN CALCIUM 20 MG/G
1 CREAM TOPICAL
Qty: 1 | Refills: 0
Start: 2025-05-24 | End: 2025-05-30

## 2025-05-24 RX ADMIN — Medication 1000 MILLILITER(S): at 02:12

## 2025-05-24 RX ADMIN — KETOROLAC TROMETHAMINE 15 MILLIGRAM(S): 30 INJECTION, SOLUTION INTRAMUSCULAR; INTRAVENOUS at 02:12

## 2025-05-24 RX ADMIN — MUPIROCIN CALCIUM 1 APPLICATION(S): 20 CREAM TOPICAL at 02:21

## 2025-05-24 NOTE — ED PROVIDER NOTE - PHYSICAL EXAMINATION
right foot - shallow skin erosion to top of foot with scattered yellow crusting  left foot - scattered punctate crusted lesions, no erythema  no swelling, 1+dp b/l

## 2025-05-24 NOTE — ED PROVIDER NOTE - CLINICAL SUMMARY MEDICAL DECISION MAKING FREE TEXT BOX
b/l feet pain, shallow abraded skin to R foot, scattered yellow crusting, c/w impetigo, no feet swelling, pulses intact. abd pain yesterday not currently, no guarding, no rebound , afebrile, doubt acute surgical abd at this time. no abd imaging indicated at this time.  -check labs  -ekg  -ivf  -bactroban

## 2025-05-24 NOTE — ED ADULT NURSE NOTE - NSFALLUNIVINTERV_ED_ALL_ED
Bed/Stretcher in lowest position, wheels locked, appropriate side rails in place/Call bell, personal items and telephone in reach/Instruct patient to call for assistance before getting out of bed/chair/stretcher/Non-slip footwear applied when patient is off stretcher/[place] to call system/Physically safe environment - no spills, clutter or unnecessary equipment/Purposeful proactive rounding/Room/bathroom lighting operational, light cord in reach

## 2025-05-24 NOTE — ED ADULT NURSE NOTE - OBJECTIVE STATEMENT
DEMAR[place], pt approached EMS c/o abdominal pain & B/L foot pain.  As per patient, had abdominal pain today, but none today.  Pt also, c/o B/L foot pain x 2 years. [place] states, [place]  P

## 2025-05-24 NOTE — ED PROVIDER NOTE - OBJECTIVE STATEMENT
80M no PMH c/o b/l feet pain. pt states has had lesions ot his feet for 2 years. also states yesterday he had abd pain. no vomiting. no diarrhea. states no pain today. no chest pain, no SOB.

## 2025-05-24 NOTE — ED PROVIDER NOTE - NSFOLLOWUPINSTRUCTIONS_ED_ALL_ED_FT
Impetigo    WHAT YOU NEED TO KNOW:    Impetigo is a skin infection caused by bacteria. The infection can cause sores to form anywhere on your body. The sores develop watery or pus-filled blisters that break and form thick crusts. Impetigo is most common in children. The bacteria that cause impetigo spreads easily from person to person.    DISCHARGE INSTRUCTIONS:    Return to the emergency department if:    You have painful, red, warm skin around the blisters.    Your face is swollen.    You urinate less than usual or you see blood in your urine.  Call your doctor if:    You have a fever.    The sores become more red, swollen, warm, or tender.    The sores do not start to heal after 3 days of treatment.    You have questions or concerns about your condition or care.  Medicines:    Antibiotics treat the bacterial infection. Antibiotics may be given as a pill or cream. Wash your skin and gently remove any crusts before you apply the antibiotic cream.    Take your medicine as directed. Contact your healthcare provider if you think your medicine is not helping or if you have side effects. Tell your provider if you are allergic to any medicine. Keep a list of the medicines, vitamins, and herbs you take. Include the amounts, and when and why you take them. Bring the list or the pill bottles to follow-up visits. Carry your medicine list with you in case of an emergency.  Prevent the spread of impetigo:    Avoid direct contact. You can spread impetigo if someone touches or uses something that touched your infected skin. You can also spread impetigo on your own body when you touch the area and then touch somewhere else. Keep the sores covered with gauze so you will not scratch or touch them. Keep your fingernails short. Your child may need to wear mittens so he or she does not scratch the sores.    Wash your hands often. Always wash your hands after you touch the infected area. Wash your hands before you touch food, your eyes, or other people. Use an alcohol-based hand  if soap and water are not available.    Wash household items. Do not share or reuse items that have come in contact with impetigo sores. Examples include bedding, towels, washcloths, and eating utensils. These items may be used again after they have been washed with hot water and laundry soap.  Clean your sores safely: Wash your skin sores with antibacterial soap and water. You may need to do this 2 to 3 times each day until the sores heal. If the area is crusted, gently wash the sores with gauze or a clean washcloth to remove the crust. Pat the area dry with a clean towel. Wash your hands, the washcloth, and the towel after you clean the area around the sores.    Return to work or school: You may return to work or school 48 hours after you start the antibiotic medicine. If your child has impetigo, tell his or her school or  center about the infection.    Follow up with your doctor as directed: Write down your questions so you remember to ask them during your visits.

## 2025-05-24 NOTE — ED PROVIDER NOTE - PROGRESS NOTE DETAILS
spouse
spouse
feet cleansed, bactroban applied and wrapped. pt tolerated po.  I have discussed the discharge plan with the patient. The patient agrees with the plan, as discussed.  The patient understands Emergency Department diagnosis is a preliminary diagnosis often based on limited information and that the patient must adhere to the follow-up plan as discussed.  The patient understands that if the symptoms worsen or if prescribed medications do not have the desired/planned effect that the patient may return to the Emergency Department at any time for further evaluation and treatment.

## 2025-05-24 NOTE — ED PROVIDER NOTE - PATIENT PORTAL LINK FT
Fall with Harm Risk
You can access the FollowMyHealth Patient Portal offered by [facility] by registering at the following website: [email or web address]. By joining [employer]’s FollowMyHealth portal, you will also be able to view your health information using other applications (apps) compatible with our system.

## 2025-05-24 NOTE — ED PROVIDER NOTE - MUSCULOSKELETAL MINIMAL EXAM
no edema/neck supple Enbrel Pregnancy And Lactation Text: This medication is Pregnancy Category B and is considered safe during pregnancy. It is unknown if this medication is excreted in breast milk.

## 2025-05-26 DIAGNOSIS — R46.0 VERY LOW LEVEL OF PERSONAL HYGIENE: ICD-10-CM

## 2025-05-26 DIAGNOSIS — R00.0 TACHYCARDIA, UNSPECIFIED: ICD-10-CM

## 2025-05-26 DIAGNOSIS — M79.671 PAIN IN RIGHT FOOT: ICD-10-CM

## 2025-05-26 DIAGNOSIS — L01.00 IMPETIGO, UNSPECIFIED: ICD-10-CM

## 2025-05-28 ENCOUNTER — EMERGENCY (EMERGENCY)
Facility: HOSPITAL | Age: 80
LOS: 1 days | End: 2025-05-28
Attending: EMERGENCY MEDICINE | Admitting: EMERGENCY MEDICINE

## 2025-05-28 VITALS
OXYGEN SATURATION: 97 % | DIASTOLIC BLOOD PRESSURE: 63 MMHG | HEIGHT: 72 IN | SYSTOLIC BLOOD PRESSURE: 111 MMHG | TEMPERATURE: 99 F | HEART RATE: 92 BPM | RESPIRATION RATE: 15 BRPM

## 2025-05-28 DIAGNOSIS — B86 SCABIES: ICD-10-CM

## 2025-05-28 DIAGNOSIS — R21 RASH AND OTHER NONSPECIFIC SKIN ERUPTION: ICD-10-CM

## 2025-05-28 DIAGNOSIS — L08.9 LOCAL INFECTION OF THE SKIN AND SUBCUTANEOUS TISSUE, UNSPECIFIED: ICD-10-CM

## 2025-05-28 PROCEDURE — 99283 EMERGENCY DEPT VISIT LOW MDM: CPT

## 2025-05-28 RX ORDER — PERMETHRIN 50 MG/G
1 CREAM TOPICAL ONCE
Refills: 0 | Status: COMPLETED | OUTPATIENT
Start: 2025-05-28 | End: 2025-05-28

## 2025-05-28 RX ADMIN — PERMETHRIN 1 APPLICATION(S): 50 CREAM TOPICAL at 10:30

## 2025-05-28 NOTE — ED ADULT TRIAGE NOTE - CHIEF COMPLAINT QUOTE
[place] pt endorses bilateral leg pain and difficulty ambulating today. No meds taken.

## 2025-05-28 NOTE — ED PROVIDER NOTE - PATIENT PORTAL LINK FT
You can access the FollowMyHealth Patient Portal offered by [facility] by registering at the following website: [email or web address]. By joining [employer]’s FollowMyHealth portal, you will also be able to view your health information using other applications (apps) compatible with our system.

## 2025-05-28 NOTE — ED PROVIDER NOTE - PHYSICAL EXAMINATION
Gen: NAD, unkempt. HEENT: NCAT, mmm   Chest: RRR, nl S1 and S2, no m/r/g. Resp: CTAB, no w/r/r  Abd: nl BS, soft, nt/nd. Ext: Warm, dry  Neuro: CN II-XII intact, normal and equal strength, sensation, and reflexes bilaterally, normal gait, speech clear  Psych: AAOx3   Skin: excoriated papular rash with tracking papules to tops of b/l feet.

## 2025-05-28 NOTE — ED PROVIDER NOTE - OBJECTIVE STATEMENT
Patient c/o itchy rash to both feet for months. Scratched opend the skin. No fever, ha, cp, sob, ap, n/v/d, focal weakness, paresthesias.

## 2025-05-28 NOTE — ED PROVIDER NOTE - CLINICAL SUMMARY MEDICAL DECISION MAKING FREE TEXT BOX
Patient with scabies. Rash on feet excoriated with local infection to top of left foot. Will Tx with permethrin, doxycycline. Return to the ED immediately if getting worse, not improving, or if having any new or troubling symptoms.

## 2025-05-28 NOTE — ED PROVIDER NOTE - CHIEF COMPLAINT
Problem: Adult Inpatient Plan of Care  Goal: Plan of Care Review  Outcome: Ongoing, Progressing  Flowsheets (Taken 3/18/2022 0232)  Progress: improving  Plan of Care Reviewed With: patient  Outcome Evaluation: Patient slept well through the night. No complaints or concerns. CIWA=0. Falls precautions in place. Q2 turns to prevent further skin breakdown.   Goal Outcome Evaluation:  Plan of Care Reviewed With: patient        Progress: improving  Outcome Evaluation: Patient slept well through the night. No complaints or concerns. CIWA=0. Falls precautions in place. Q2 turns to prevent further skin breakdown.   The patient is a 80y Male complaining of pain, lower leg.

## 2025-05-28 NOTE — ED PROVIDER NOTE - NSFOLLOWUPINSTRUCTIONS_ED_ALL_ED_FT
Scabies, Adult  Red rash on a person's hands, with a close-up of the mite that causes it.  Scabies is a skin condition that happens when very small insects called mites get under your skin. This causes severe itchiness and a rash that looks like pimples. Scabies is contagious. This means it can spread easily from person to person. If you get scabies, the people you live with may get it too.    With the right treatment, symptoms often go away in 2–4 weeks. In most cases, scabies does not cause lasting problems.    What are the causes?  Scabies is caused by tiny mites (Sarcoptes scabiei) that can only be seen with a microscope. The mites get into the top layer of your skin and lay eggs. This is called an infestation. You may get scabies if:  You have close contact with someone who has scabies.  You come in contact with items that have the mites on them. These may include towels, bedding, or clothes.  What increases the risk?  You may be more likely to get scabies if:  You live in a nursing home or extended care facility.  You spend time in a place where a lot of people live close together, such as a shelter or jail.  You have sex with a partner who has scabies.  You care for others who are at risk for scabies.  What are the signs or symptoms?  Symptoms of scabies include:  A rash that looks like pimples. It may include tiny red bumps or blisters. It is often found in the skinfolds or on the hands, wrists, elbows, armpits, chest, waist, groin, or buttocks.  Severe itchiness. This is often worse at night.  Skin irritation. This can include scaly patches or sores.  The bumps from scabies may form a line (burrow) on the skin. The line may look thin, crooked, and grayish-white or skin colored.    How is this diagnosed?  Scabies may be diagnosed based on a physical exam of your skin. You may also have a skin test done. A sample of your skin may be taken (skin scraping) and looked at under a microscope for signs of mites.    How is this treated?  Scabies may be treated with:  Medicated creams or lotions to kill the mites. The cream or lotion is spread on your whole body and left for a few hours. In most cases, one treatment is enough to kill all the mites. In severe cases, the treatment may need to be done more than once.  Medicated cream to help with the itching.  Medicines taken by mouth (orally). These may help:  Relieve itching.  Reduce the swelling and redness.  Kill the mites. This treatment may be used in severe cases.  Follow these instructions at home:  Medicines    Take or apply over-the-counter and prescription medicines only as told by your health care provider.  Apply medicated cream or lotion as told by your provider.  Do not wash off the medicated cream or lotion until enough time has passed or as told by your provider.  Skin care    Try not to scratch or pick at the affected areas of your skin.  Keep your fingernails closely trimmed. This can help reduce injury from scratching.  Take cool baths or apply cool, wet cloths to your skin. This can help reduce itching.  General instructions    Clean all items that you touched in the 3 days before you were diagnosed. This includes bedding, clothes, towels, and furniture. Do this on the same day that you start treatment.  Dry-clean items or use hot water to wash them. Dry them on the hot dry cycle.  Place items that cannot be washed into closed, airtight plastic bags for at least 3 days. The mites cannot live for more than 3 days away from human skin.  Vacuum your furniture and mattresses.  Make sure that other people who may have been infested see a provider.  Where to find more information  Centers for Disease Control and Prevention (CDC): cdc.gov  Contact a health care provider if:  You have itching that does not go away after 4 weeks of treatment.  You keep getting new bumps or burrows.  You have redness, swelling, or pain near your rash after treatment.  You have fluid, blood, or pus coming from your rash.  You get thick crusts or scaly patches over large areas of your skin.  You have a fever.  This information is not intended to replace advice given to you by your health care provider. Make sure you discuss any questions you have with your health care provider.

## 2025-05-28 NOTE — ED ADULT NURSE NOTE - OBJECTIVE STATEMENT
Patient is an 81 y/o M c/o lower leg pain. Patient reports b/lo lower leg pain. Patient has skin flaking. Patient also requesting meds for scabies and head lice.

## 2025-07-03 NOTE — ED ADULT NURSE NOTE - HOW OFTEN DO YOU HAVE A DRINK CONTAINING ALCOHOL?
Med: fluoxetine  Dosage: 20 mg  Sig:  Take 1 capsule by mouth daily  Quantity requested:  90      Mail Order: no    Preferred pharmacy has been set up and verified.     Never